# Patient Record
Sex: MALE | Race: WHITE
[De-identification: names, ages, dates, MRNs, and addresses within clinical notes are randomized per-mention and may not be internally consistent; named-entity substitution may affect disease eponyms.]

---

## 2021-12-07 ENCOUNTER — HOSPITAL ENCOUNTER (OUTPATIENT)
Dept: HOSPITAL 53 - M RAD | Age: 56
End: 2021-12-07
Attending: NURSE PRACTITIONER
Payer: COMMERCIAL

## 2021-12-07 DIAGNOSIS — R91.8: Primary | ICD-10-CM

## 2021-12-08 NOTE — REP
INDICATION:

ABNORMAL FINDING OF LUNG FIELD.



COMPARISON:

11/12/2020.



TECHNIQUE:

CT chest performed without the use of intravenous contrast.  Sagittal and coronal

reconstruction images are performed.



FINDINGS:

Lungs: Scattered diffuse mild interstitial fibrotic scarring is noted along with

bronchiectasis.  There is a 5 mm nodule again seen in the superior segment of the

right lower lobe, unchanged.  No other pulmonary nodules are seen.

Mediastinum: No gross adenopathy.

Katy: No gross adenopathy.

Axilla: No gross adenopathy.

Pleura: No effusion.

Heart: Not enlarged.

Thoracic aorta: No aneurysm.

Upper abdominal structures: Several cysts are seen in the left lobe of the liver, the

largest measures 2.7 cm in diameter.

Visualized osseous structures: There are degenerative changes of the spine without

compression deformity.



IMPRESSION:

Stable 5 mm nodule right lower lobe.  No other pulmonary nodule identified.  No

adenopathy.  Recommend follow-up CT in 1 year, as per [employer]ischner [employer] criteria.





<Electronically signed by Eddie Colvin > 12/08/21 [id number]

## 2022-12-08 ENCOUNTER — HOSPITAL ENCOUNTER (OUTPATIENT)
Dept: HOSPITAL 53 - M RAD | Age: 57
End: 2022-12-08
Attending: NURSE PRACTITIONER
Payer: COMMERCIAL

## 2022-12-08 DIAGNOSIS — R91.8: Primary | ICD-10-CM
